# Patient Record
Sex: FEMALE | Race: WHITE | NOT HISPANIC OR LATINO | Employment: FULL TIME | RURAL
[De-identification: names, ages, dates, MRNs, and addresses within clinical notes are randomized per-mention and may not be internally consistent; named-entity substitution may affect disease eponyms.]

---

## 2023-11-10 ENCOUNTER — OFFICE VISIT (OUTPATIENT)
Dept: SPINE | Facility: CLINIC | Age: 43
End: 2023-11-10
Payer: COMMERCIAL

## 2023-11-10 ENCOUNTER — HOSPITAL ENCOUNTER (OUTPATIENT)
Dept: RADIOLOGY | Facility: HOSPITAL | Age: 43
Discharge: HOME | End: 2023-11-10
Attending: ORTHOPAEDIC SURGERY
Payer: COMMERCIAL

## 2023-11-10 VITALS — HEIGHT: 63 IN | BODY MASS INDEX: 25.69 KG/M2 | WEIGHT: 145 LBS

## 2023-11-10 DIAGNOSIS — G89.29 CHRONIC SI JOINT PAIN: ICD-10-CM

## 2023-11-10 DIAGNOSIS — M53.3 CHRONIC SI JOINT PAIN: Primary | ICD-10-CM

## 2023-11-10 DIAGNOSIS — G89.29 CHRONIC LEFT HIP PAIN: ICD-10-CM

## 2023-11-10 DIAGNOSIS — M53.3 CHRONIC SI JOINT PAIN: ICD-10-CM

## 2023-11-10 DIAGNOSIS — M54.42 CHRONIC LEFT-SIDED LOW BACK PAIN WITH LEFT-SIDED SCIATICA: Primary | ICD-10-CM

## 2023-11-10 DIAGNOSIS — G89.29 CHRONIC LEFT-SIDED LOW BACK PAIN WITH LEFT-SIDED SCIATICA: Primary | ICD-10-CM

## 2023-11-10 DIAGNOSIS — G89.29 CHRONIC SI JOINT PAIN: Primary | ICD-10-CM

## 2023-11-10 DIAGNOSIS — M25.552 CHRONIC LEFT HIP PAIN: ICD-10-CM

## 2023-11-10 PROCEDURE — 72202 X-RAY EXAM SI JOINTS 3/> VWS: CPT | Mod: TC

## 2023-11-10 PROCEDURE — 72110 X-RAY EXAM L-2 SPINE 4/>VWS: CPT | Mod: TC

## 2023-11-10 PROCEDURE — 99204 OFFICE O/P NEW MOD 45 MIN: CPT | Mod: S$GLB,,, | Performed by: ORTHOPAEDIC SURGERY

## 2023-11-10 PROCEDURE — 72202 XR SACROILIAC JOINTS COMPLETE: ICD-10-PCS | Mod: 26,,, | Performed by: ORTHOPAEDIC SURGERY

## 2023-11-10 PROCEDURE — 72202 X-RAY EXAM SI JOINTS 3/> VWS: CPT | Mod: 26,,, | Performed by: ORTHOPAEDIC SURGERY

## 2023-11-10 PROCEDURE — 1159F PR MEDICATION LIST DOCUMENTED IN MEDICAL RECORD: ICD-10-PCS | Mod: S$GLB,,, | Performed by: ORTHOPAEDIC SURGERY

## 2023-11-10 PROCEDURE — 1160F RVW MEDS BY RX/DR IN RCRD: CPT | Mod: S$GLB,,, | Performed by: ORTHOPAEDIC SURGERY

## 2023-11-10 PROCEDURE — 1160F PR REVIEW ALL MEDS BY PRESCRIBER/CLIN PHARMACIST DOCUMENTED: ICD-10-PCS | Mod: S$GLB,,, | Performed by: ORTHOPAEDIC SURGERY

## 2023-11-10 PROCEDURE — 99204 PR OFFICE/OUTPT VISIT, NEW, LEVL IV, 45-59 MIN: ICD-10-PCS | Mod: S$GLB,,, | Performed by: ORTHOPAEDIC SURGERY

## 2023-11-10 PROCEDURE — 3008F PR BODY MASS INDEX (BMI) DOCUMENTED: ICD-10-PCS | Mod: S$GLB,,, | Performed by: ORTHOPAEDIC SURGERY

## 2023-11-10 PROCEDURE — 72110 X-RAY EXAM L-2 SPINE 4/>VWS: CPT | Mod: 26,,, | Performed by: ORTHOPAEDIC SURGERY

## 2023-11-10 PROCEDURE — 3008F BODY MASS INDEX DOCD: CPT | Mod: S$GLB,,, | Performed by: ORTHOPAEDIC SURGERY

## 2023-11-10 PROCEDURE — 72110 XR LUMBAR SPINE 4-5 VIEW WITH BENDING VIEWS: ICD-10-PCS | Mod: 26,,, | Performed by: ORTHOPAEDIC SURGERY

## 2023-11-10 PROCEDURE — 99203 OFFICE O/P NEW LOW 30 MIN: CPT | Mod: PBBFAC | Performed by: ORTHOPAEDIC SURGERY

## 2023-11-10 PROCEDURE — 1159F MED LIST DOCD IN RCRD: CPT | Mod: S$GLB,,, | Performed by: ORTHOPAEDIC SURGERY

## 2023-11-10 RX ORDER — MELOXICAM 7.5 MG/1
7.5 TABLET ORAL 2 TIMES DAILY
Qty: 60 TABLET | Refills: 11 | Status: SHIPPED | OUTPATIENT
Start: 2023-11-10

## 2023-11-10 RX ORDER — NORETHINDRONE ACETATE AND ETHINYL ESTRADIOL 1MG-20(21)
1 KIT ORAL
COMMUNITY
Start: 2023-10-13

## 2023-11-10 NOTE — LETTER
November 10, 2023      [facility]  [address]  [place] [address]  Phone: [phone]  Fax: [phone]       Patient: Carina Glynn   YOB: 1980  Date of Visit: 11/10/2023    To Whom It May Concern:    Lalitha Glynn  was at [facility] on 11/10/2023. Please excuse her absence. If you have any questions or concerns, or if I can be of further assistance, please do not hesitate to contact me.    Sincerely,    Dr. Ector Aleman

## 2023-11-10 NOTE — PROGRESS NOTES
MDM/time:  Greater than 45 minutes spent on this encounter including 15 minutes reviewing imaging and notes, 20 minutes with the patient, 10 minutes documentation    ASSESSMENT:  43 y.o. female with lumbar spondylosis with radiculopathy    PLAN:  Mobic 7.5 mg 1 tablet twice a day  Physical therapy lumbar spine  Could consider referral to pain management could consider referral to pain management to discuss injections  Follow-up in 6 weeks if no improvement consider MRI    HPI:  43 y.o. female here for evaluation of left-sided lower back pain that radiates into the left hip down the left gluteal fold and thigh.  Patient reports she is had pain for the past 2 years off unknown on the left lower side in the past his thought this may be related to her SI joint but now she said over the past few weeks pain feels like it is wrapping around the hip area with a burning radiating pain.  Increased pain with standing.  Patient denies difficulty with  strength.  No issues with balance or falls.  Denies bladder bowel incontinence.  No difficulty walking distances.  Currently taking Aleve as needed for pain.  No recent physical therapy.  No prior pain management.  No recent MRI.  No prior spine surgery.  Patient is not a smoker.    IMAGING:  AP, lateral, flexion/extension views of the lumbar spine reviewed     On the AP there is normal coronal alignment.  There are 5 non-rib-bearing lumbar vertebrae.  On the lateral there is maintained lumbar lordosis.  No fractures or listhesis noted.  No instability on flexion-extension views.     Impression:  Normal lumbar spine       No past medical history on file.  No past surgical history on file.  Social History     Tobacco Use    Smoking status: Never    Smokeless tobacco: Never      Current Outpatient Medications   Medication Instructions    norethindrone-ethinyl estradiol (JUNEL FE 1/20) 1 mg-20 mcg (21)/75 mg (7) per tablet 1 tablet, Oral        EXAM:  Constitutional  General  Appearance:  Body mass index is 25.69 kg/m²., NAD  Psychiatric   Orientation: Oriented to time, oriented to place, oriented to person  Mood and Affect: Active and alert, normal mood, normal affect  Gait and Station   Appearance:  Normal gait, normal tandem gait, able to walk on toes, able to walk on heels    LUMBAR  Musculoskeletal System   Hips: Normal appearance, no leg length discrepancy, painful decreased motion; left, normal motion; right    Lumbar Spine                   Inspection:  Normal alignment, normal sagittal balance                  Range of motion: Normal flexion, extension, lateral bending, rotation. Pain with range of motion                  Bony Palpation of the Lumbar Spine:  No tenderness of the spinous process, no tenderness of the sacrum, no tenderness of the coccyx                  Bony Palpation of the Right Hip:  No tenderness of the iliac crest, no tenderness of the sciatic notch, no tenderness of the SI joint                  Bony Palpation of the Left Hip:  No tenderness of the iliac crest, no tenderness of the sciatic notch, no tenderness of the SI joint                  Soft Tissue Palpation on the Right:  No tenderness of the paraspinal region, no tenderness of the iliolumbar region                  Soft Tissue Palpation on the Left:  No tenderness of the paraspinal region, no tenderness of the iliolumbar region    Motor Strength   L1 Right:  Hip flexion iliopsoas 5/5    L1 Left:  Hip flexion iliopsoas 5/5              L2-L4 Right:  Knee extension quadriceps 5/5, tibialis anterior 5/5              L2-L4 Left:  Knee extension quadriceps 5/5, tibialis anterior 5/5   L5 Right:  Extensor hallucis llongus 5/5,    L5 Left:  Extensor hallucis longus 5/5,    S1 Right:  Plantar flexion gastrocnemius 5/5   S1 Left:  Plantar flexion gastrocnemius 5/5    Neurological System   Ankle Reflex Right:  normal   Ankle Reflex Left: normal   Knee Reflex Right:  normal   Knee Reflex Left:   normal   Sensation on the Right:  L2 normal, L3 normal, L4 normal, L5 normal, S1 normal   Sensation on the Left:  L2 normal, L3 normal, L4 normal, L5 normal, S1 normal              Special Test on the Right:  Seated straight leg raising test negative, no clonus of the ankle              Special Test on the Left:  Seated straight leg raising test negative, no clonus of the ankle    Skin   Lumbosacral Spine:  Normal skin    Cardiovascular System   Arterial Pulses Right:  Posterior tibialis normal, dorsalis pedis normal   Arterial Pulses Left:  Posterior tibialis normal, dorsalis pedis normal   Edema Right: None   Edema Left:  None

## 2023-11-10 NOTE — PROGRESS NOTES
AP pelvis and oblique views of bilateral SI joints reviewed     No pelvic ring injuries noted.  No significant hip arthritis.  Minimal bilateral sacroiliac arthritis.      Impression:  Minimal bilateral sacroiliac arthritis

## 2023-11-27 ENCOUNTER — CLINICAL SUPPORT (OUTPATIENT)
Dept: REHABILITATION | Facility: HOSPITAL | Age: 43
End: 2023-11-27
Payer: COMMERCIAL

## 2023-11-27 DIAGNOSIS — M54.42 CHRONIC LEFT-SIDED LOW BACK PAIN WITH LEFT-SIDED SCIATICA: Primary | ICD-10-CM

## 2023-11-27 DIAGNOSIS — M25.552 CHRONIC LEFT HIP PAIN: ICD-10-CM

## 2023-11-27 DIAGNOSIS — G89.29 CHRONIC LEFT-SIDED LOW BACK PAIN WITH LEFT-SIDED SCIATICA: Primary | ICD-10-CM

## 2023-11-27 DIAGNOSIS — G89.29 CHRONIC LEFT HIP PAIN: ICD-10-CM

## 2023-11-27 PROCEDURE — 97161 PT EVAL LOW COMPLEX 20 MIN: CPT

## 2023-11-27 NOTE — PLAN OF CARE
"[facility]  Physical Therapy Initial Evaluation    Name: Carina Glynn  Clinic Number: [id number]    Therapy Diagnosis:   Encounter Diagnoses   Name Primary?    Chronic left-sided low back pain with left-sided sciatica Yes    Chronic left hip pain      Physician: Ector Aleman MD    Physician Orders: PT Eval and Treat  Medical Diagnosis from Referral: M54.42,G89.29 (ICD-10-CM) - Chronic left-sided low back pain with left-sided sciatica and M25.552,G89.29 (ICD-10-CM) - Chronic left hip pain  Evaluation Date: 11/27/2023  Authorization Period Expiration: 11/9/2024  Plan of Care Expiration: 1/12/2024  Visit # / Visits authorized: 1/13 (including initial evaluation; 50 visits approved by [employer] through 12/31/2023)    Time In: 1451  Time Out: 1530  Total Appointment Time (timed & untimed codes): 39 minutes    Precautions: Standard    Subjective     Date of onset: ~2 years ago    History of current condition - Carina reports an ~2 year history of left-sided low back pain that radiates into the left hip and down the left gluteal fold and thigh to the knee. Patient reports increased pain with standing and prolonged positioning. Patient reports she was recently started on Mobic which she feels may have helped her pain some. Patient reports her nephew, who is a chiropractor, worked with her over the weekend. Patient reports she was initially very sore but has begun to feel some better since. Patient reports she wore heels for the first time today in quite a while and feels as though her low back/left hip pain has increased some as a result.     Falls: none    Imaging:    X-rays from 11/10/2023:    "AP pelvis and oblique views of bilateral SI joints reviewed: No pelvic ring injuries noted. No significant hip arthritis. Minimal bilateral sacroiliac arthritis."    "AP, lateral, flexion/extension views of the lumbar spine reviewed: On the AP there is normal coronal alignment. There are " "5 non-rib-bearing lumbar vertebrae. On the lateral there is maintained lumbar lordosis. No fractures or listhesis noted. No instability on flexion-extension views. Impression: Normal lumbar spine."    Prior Therapy: none  Steps/Stairs: none  Occupation: Principal at [employer]  Driving: Yes  Durable Medical Equipment: none  Affected Extremity: left  Prior Level of Function: independent with all functional mobility without assistive device  Current Level of Function: same as prior level of function but with increase pain in the left lower back/hip with radiculopathy into the left posterior lower extremity to the knee    Pain:  Current 3/10, worst 8/10, best 1/10   Location: left low back wrapping around the left hip with radiculopathy into the left posterior lower extremity to the knee   Description: Burning, Tight, Tingling, and Shooting  Aggravating Factors: prolonged positioning (tight/stiff in mornings after lying in bed all night)  Easing Factors: massage, pain medication, heating pad, and rest    Pts goals: Patient wishes to decrease the pain in her left lower back/hip.    Medical History:   No past medical history on file.    Surgical History:   Carina Glynn  has no past surgical history on file.    Medications:   Carina has a current medication list which includes the following prescription(s): meloxicam and norethindrone-ethinyl estradiol.    Allergies:   Review of patient's allergies indicates:  No Known Allergies     Objective     Posture:  Standing lordosis: WNL  Sitting lordosis: WNL  Iliac crest height: right, increased  PSIS height: right, increased  Pelvic rotation/torsion: Yes (right anterior innominate rotation)  Scoliosis: No  Lateral shift: No    Thoracolumbar range of motion:   AROM Pain/Dysfunction with Movement   Flexion WFL    Extension WFL Pain at end range   Right lateral flexion WFL    Left lateral flexion WFL    Right rotation WFL    Left rotation WFL      Manual muscle " testing:   Right Left   Hip flexion MMT number: 4+/5 MMT strength: 4-/5   Hip abduction MMT strength: 4+/5 MMT strength: 4-/5   Knee extension MMT strength: 5/5 MMT strength: 4+/5   Knee flexion  MMT strength: 5/5 MMT strength: 4/5   Ankle dorsiflexion MMT strength: 5/5 MMT strength: 5/5   Ankle plantar flexion  MMT strength: 5/5 MMT strength: 5/5     Hip/knee range of motion:   Right Left    Hip flexion (120) WFL WFL   Hip extension WFL WFL   Hip abduction WFL WFL   Hip adduction WFL WFL   Internal rotation (45) WFL WFL   External rotation (45) WFL WFL   Hamstring 90/90 (-10) WFL WFL   Knee extension WFL WFL   Knee flexion (120) WFL WFL     Clinical Special Tests:  SLR test < 60 degrees: right Negative; left Negative  SLR test > 60 degrees: right Negative; left Negative  Piriformis test: right Negative; left Positive  ELLI test: right Negative; left Positive  SI compression: right Negative; left Positive  Bilateral SLR: right Negative; left Negative    Gait:  Weight bearing precautions: WBAT  Assistive device: none  Ambulation distance and deviations: independent without assistive device; no abnormalities noted  Stairs: WFL    Leg length: right lower extremity slightly longer than left in standing, supine and sunrise view    Palpation: tenderness on palpation of the left SI joint and with P-A joint mobilization of the sacrum on the left; slight relief with long-axis manual distraction of the left lower extremity    Dermatome: WFL    Limitation/Restriction for FOTO Intake Survey    Therapist reviewed FOTO scores for Carina Glynn on 11/27/2023.   FOTO documents entered into [employer] app - see Media section.    Limitation Score: 72.2%       Patient Education and Home Exercises     Education provided: Patient educated on the importance of completing skilled physical therapy treatment and home exercise program as prescribed to maximize functional gains.     Written Home Exercises Provided: yes. Exercises were reviewed and  "Carina was able to demonstrate them prior to the end of the session. Carina demonstrated good understanding of the education provided.     See EMR under "Patient Instructions" for exercises provided during therapy sessions.    Assessment     Carina is a 43 y.o. female referred to outpatient physical therapy with a medical diagnosis of M54.42,G89.29 (ICD-10-CM) - Chronic left-sided low back pain with left-sided sciatica and M25.552,G89.29 (ICD-10-CM) - Chronic left hip pain. Pt presents to PT with left lower back and left hip pain, left lower extremity weakness, a leg length discrepancy (right longer), and a right anterior innominate pelvic rotation.    Pt prognosis is Good.   Pt will benefit from skilled outpatient Physical Therapy to address the deficits stated above and in the chart below, provide pt/family education, and to maximize pt's level of independence.     Plan of care discussed with patient: Yes  Pt's spiritual, cultural and educational needs considered and pt agreeable to plan of care and goals as stated below:     Anticipated Barriers for therapy: compliance with home exercise program    Medical Necessity is demonstrated by the following:  History  Co-morbidities and personal factors that may impact the plan of care [x] LOW: no personal factors / co-morbidities  [] MODERATE: 1-2 personal factors / co-morbidities  [] HIGH: 3+ personal factors / co-morbidities    Moderate / High Support Documentation:   Co-morbidities affecting plan of care: N/A    Personal Factors:   no deficits     Examination  Body Structures and Functions, activity limitations and participation restrictions that may impact the plan of care [x] LOW: addressing 1-2 elements  [] MODERATE: 3+ elements  [] HIGH: 4+ elements (please support below)    Moderate / High Support Documentation: strength and posture     Clinical Presentation [x] LOW: stable  [] MODERATE: Evolving  [] HIGH: Unstable     Decision Making/ Complexity Score: low   "     Goals:    Short Term Goals:  Patient will demonstrate independence with home exercise program to ensure carryover of treatment.  Patient will improve left lower extremity strength to 4+/5 to improve independence and safety with bed mobility, transfers, and gait.  Patient will report a reduction in left lower-back/hip pain from 8/10 to 6/10 at worst to improve quality of life.     Long Term Goals:  Patient will improve bilateral lower extremity strength to 5/5 to improve independence and safety with bed mobility, transfers, and gait.  Patient will demonstrate equal leg length/iliac crest height in standing to improve functional posture and reduce risk for further injury.   Patient will report a reduction in left lower-back/hip pain from 8/10 to 4/10 at worst to improve quality of life.     Plan     Plan of care Certification: 11/27/2023 to 1/12/2024.    Outpatient Physical Therapy 2 times weekly for 6 weeks to include the following interventions: Electrical Stimulation (IFC/premodulated IFC), Manual Therapy, Moist Heat/ Ice, Neuromuscular Re-ed, Patient Education, Therapeutic Activities, Therapeutic Exercise, and Ultrasound.       Raphael Bob, PT, DPT  11/27/2023

## 2023-11-30 ENCOUNTER — CLINICAL SUPPORT (OUTPATIENT)
Dept: REHABILITATION | Facility: HOSPITAL | Age: 43
End: 2023-11-30
Payer: COMMERCIAL

## 2023-11-30 DIAGNOSIS — G89.29 CHRONIC LEFT HIP PAIN: Primary | ICD-10-CM

## 2023-11-30 DIAGNOSIS — G89.29 CHRONIC LEFT-SIDED LOW BACK PAIN WITH LEFT-SIDED SCIATICA: ICD-10-CM

## 2023-11-30 DIAGNOSIS — M54.42 CHRONIC LEFT-SIDED LOW BACK PAIN WITH LEFT-SIDED SCIATICA: ICD-10-CM

## 2023-11-30 DIAGNOSIS — M25.552 CHRONIC LEFT HIP PAIN: Primary | ICD-10-CM

## 2023-11-30 PROCEDURE — 97140 MANUAL THERAPY 1/> REGIONS: CPT | Mod: CQ

## 2023-11-30 PROCEDURE — 97110 THERAPEUTIC EXERCISES: CPT | Mod: CQ

## 2023-11-30 PROCEDURE — 97112 NEUROMUSCULAR REEDUCATION: CPT | Mod: CQ

## 2023-11-30 NOTE — PROGRESS NOTES
[facility]  Physical Therapy Treatment Note    Name: Carina Glynn  Clinic Number: [id number]    Therapy Diagnosis:   Encounter Diagnoses   Name Primary?    Chronic left hip pain Yes    Chronic left-sided low back pain with left-sided sciatica      Physician: Ector Aleman MD    Visit Date: 11/30/2023    Physician Orders: PT Eval and Treat  Medical Diagnosis from Referral: M54.42,G89.29 (ICD-10-CM) - Chronic left-sided low back pain with left-sided sciatica and M25.552,G89.29 (ICD-10-CM) - Chronic left hip pain  Evaluation Date: 11/27/2023  Authorization Period Expiration: 11/9/2024  Plan of Care Expiration: 1/12/2024  Visit # / Visits authorized: 2/13 (including initial evaluation; 50 visits approved by [employer] through 12/31/2023)  PTA Visit #: 1    Time In: 1532  Time Out: 1614  Total Billable Time: 42 minutes    Precautions: Standard    Subjective     Pt reports: she has no pain at beginning of treatment; patient states she has been doing her home exercises program and feels like it is helping.    She was compliant with home exercise program.    Pain: 0/10  Location: left low back wrapping around the left hip with radiculopathy into the left posterior lower extremity to the knee      Objective     Carina received therapeutic exercises to develop strength, endurance, ROM, flexibility, and posture for 20 minutes including:    Nu step x 5 min  Calf board stretch on slant board: x 2 minutes  Hamstring stretch on step: 3 x 20 sec hold each  Open books: x 10 reps x 5 sec hold each (manual stabilization of hip)  Lateral trunk rotations 10 x 10 sec hold (manual overpressure)  Double knee to chest ball rollouts: 20 reps x 5 sec hold    Carina received the following manual therapy techniques: were applied to the: lumbar back for 12 minutes, including:    Manual lumbar traction using chair  Piriformis stretch (bilateral)  Sciatic nerve stretch (knee extended and hip internally  rotated)  Myofascial release to left hip and ITB using roller    Carina participated in neuromuscular re-education activities to improve: Posture for 10 minutes. The following activities were included:    Supine pelvic tilt: x 20 reps x 5 sec hold  Supine pelvic tilt with alternating marching: x 20 reps  Bridging: x 20 reps    Carina participated in dynamic functional therapeutic activities to improve functional performance for 0 minutes, including:    Chair squats: 2 x 10 reps (not performed)      Home Exercises Provided and Patient Education Provided     Education provided: Patient educated on the importance of completing skilled physical therapy treatment and home exercise program as prescribed to maximize functional gains.    Written Home Exercises Provided: Patient instructed to cont prior HEP. Exercises were reviewed and Carina was able to demonstrate them prior to the end of the session.  Carina demonstrated good  understanding of the education provided.     See EMR under Patient Instructions for exercises provided  during therapy sessions .    Assessment     Patient with good effort during treatment. Patient with slight leg length discrepancy (right > left) checked by PT Raphael Bob. Patient able to perform all exercises with minimal complaint of sciatic nerve pain. Patient not tender on manual to ITB with roller, but did have pain with sciatic nerve stretching. Patient with good tolerance of all manual performed this session. Patient with no complaints following treatment.    Carina isprogressing well towards her goals.   Pt prognosis is Good.     Pt will continue to benefit from skilled outpatient physical therapy to address the deficits listed in the problem list box on initial evaluation, provide pt/family education, and to maximize pt's level of independence in the home and community environment.     Pt's spiritual, cultural, and educational needs considered and pt agreeable to plan of care and  goals.     Anticipated barriers to physical therapy: compliance with home exercises program    Goals:    Short Term Goals:  Patient will demonstrate independence with home exercise program to ensure carryover of treatment.  Patient will improve left lower extremity strength to 4+/5 to improve independence and safety with bed mobility, transfers, and gait.  Patient will report a reduction in left lower-back/hip pain from 8/10 to 6/10 at worst to improve quality of life.      Long Term Goals:  Patient will improve bilateral lower extremity strength to 5/5 to improve independence and safety with bed mobility, transfers, and gait.  Patient will demonstrate equal leg length/iliac crest height in standing to improve functional posture and reduce risk for further injury.   Patient will report a reduction in left lower-back/hip pain from 8/10 to 4/10 at worst to improve quality of life.        Plan     Patient will continue to benefit from skilled physical therapy treatment as prescribed working towards goals listed above to maximize functional potential.     Abdifatah Hansen, PTA  11/30/2023

## 2023-12-05 ENCOUNTER — CLINICAL SUPPORT (OUTPATIENT)
Dept: REHABILITATION | Facility: HOSPITAL | Age: 43
End: 2023-12-05
Payer: COMMERCIAL

## 2023-12-05 DIAGNOSIS — M54.42 CHRONIC LEFT-SIDED LOW BACK PAIN WITH LEFT-SIDED SCIATICA: Primary | ICD-10-CM

## 2023-12-05 DIAGNOSIS — G89.29 CHRONIC LEFT-SIDED LOW BACK PAIN WITH LEFT-SIDED SCIATICA: Primary | ICD-10-CM

## 2023-12-05 DIAGNOSIS — M25.552 CHRONIC LEFT HIP PAIN: ICD-10-CM

## 2023-12-05 DIAGNOSIS — G89.29 CHRONIC LEFT HIP PAIN: ICD-10-CM

## 2023-12-05 PROCEDURE — 97140 MANUAL THERAPY 1/> REGIONS: CPT | Mod: CQ

## 2023-12-05 PROCEDURE — 97110 THERAPEUTIC EXERCISES: CPT | Mod: CQ

## 2023-12-05 PROCEDURE — 97112 NEUROMUSCULAR REEDUCATION: CPT | Mod: CQ

## 2023-12-05 NOTE — PROGRESS NOTES
[facility]  Physical Therapy Treatment Note    Name: Carina Glynn  Clinic Number: [id number]    Therapy Diagnosis:   Encounter Diagnoses   Name Primary?    Chronic left-sided low back pain with left-sided sciatica Yes    Chronic left hip pain        Physician: Ector Aleman MD    Visit Date: 12/5/2023    Physician Orders: PT Eval and Treat  Medical Diagnosis from Referral: M54.42,G89.29 (ICD-10-CM) - Chronic left-sided low back pain with left-sided sciatica and M25.552,G89.29 (ICD-10-CM) - Chronic left hip pain  Evaluation Date: 11/27/2023  Authorization Period Expiration: 11/9/2024  Plan of Care Expiration: 1/12/2024  Visit # / Visits authorized: 3/13 (including initial evaluation; 50 visits approved by [employer] through 12/31/2023)  PTA Visit #: 2    Time In: 1526 (Patient treatment started prior to being checked in)  Time Out: 1610  Total Billable Time: 44 minutes    Precautions: Standard    Subjective     Pt reports: she is doing well today with no pain reported    She was compliant with home exercise program.    Pain: 0/10  Location: left low back wrapping around the left hip with radiculopathy into the left posterior lower extremity to the knee      Objective     Carina received therapeutic exercises to develop strength, endurance, ROM, flexibility, and posture for 20 minutes including:    Nu step x 5 min  Calf board stretch on slant board: x 2 minutes  Hamstring stretch on step: 3 x 20 sec hold each  Open books: x 10 reps x 5 sec hold each (manual stabilization of hip)  Lateral trunk rotations 10 x 10 sec hold  Double knee to chest ball rollouts: 20 reps x 5 sec hold    Carina received the following manual therapy techniques: were applied to the: lumbar back for 14 minutes, including:    Manual lumbar traction using chair  Piriformis stretch (bilateral)  Sciatic nerve stretch (knee extended and hip internally rotated)    Myofascial release to left hip and ITB using roller  (not performed)    Carina participated in neuromuscular re-education activities to improve: Posture for 10 minutes. The following activities were included:    Supine pelvic tilt: x 20 reps x 5 sec hold  Supine pelvic tilt with alternating marching: x 20 reps  Bridging: x 20 reps    Carina participated in dynamic functional therapeutic activities to improve functional performance for 0 minutes, including:    Chair squats: 2 x 10 reps (not performed)      Home Exercises Provided and Patient Education Provided     Education provided: Patient educated on the importance of completing skilled physical therapy treatment and home exercise program as prescribed to maximize functional gains.    Written Home Exercises Provided: Patient instructed to cont prior HEP. Exercises were reviewed and Carina was able to demonstrate them prior to the end of the session.  Carina demonstrated good  understanding of the education provided.     See EMR under Patient Instructions for exercises provided  during therapy sessions .    Assessment     Patient with good effort during treatment. Patient able to perform all exercises with minimal complaint of sciatic nerve pain. Patient with good tolerance of all manual and chair traction performed this session. Patient with no complaints of pain following treatment.    Carina isprogressing well towards her goals.   Pt prognosis is Good.     Pt will continue to benefit from skilled outpatient physical therapy to address the deficits listed in the problem list box on initial evaluation, provide pt/family education, and to maximize pt's level of independence in the home and community environment.     Pt's spiritual, cultural, and educational needs considered and pt agreeable to plan of care and goals.     Anticipated barriers to physical therapy: compliance with home exercises program    Goals:    Short Term Goals:  Patient will demonstrate independence with home exercise program to ensure  carryover of treatment.  Patient will improve left lower extremity strength to 4+/5 to improve independence and safety with bed mobility, transfers, and gait.  Patient will report a reduction in left lower-back/hip pain from 8/10 to 6/10 at worst to improve quality of life.      Long Term Goals:  Patient will improve bilateral lower extremity strength to 5/5 to improve independence and safety with bed mobility, transfers, and gait.  Patient will demonstrate equal leg length/iliac crest height in standing to improve functional posture and reduce risk for further injury.   Patient will report a reduction in left lower-back/hip pain from 8/10 to 4/10 at worst to improve quality of life.        Plan     Patient will continue to benefit from skilled physical therapy treatment as prescribed working towards goals listed above to maximize functional potential.     Abdifatah Hansen, REBECA  12/5/2023

## 2023-12-28 ENCOUNTER — DOCUMENTATION ONLY (OUTPATIENT)
Dept: REHABILITATION | Facility: HOSPITAL | Age: 43
End: 2023-12-28
Payer: COMMERCIAL

## 2023-12-28 PROBLEM — M54.42 CHRONIC LEFT-SIDED LOW BACK PAIN WITH LEFT-SIDED SCIATICA: Status: RESOLVED | Noted: 2023-11-27 | Resolved: 2023-12-28

## 2023-12-28 PROBLEM — G89.29 CHRONIC LEFT HIP PAIN: Status: RESOLVED | Noted: 2023-11-27 | Resolved: 2023-12-28

## 2023-12-28 PROBLEM — G89.29 CHRONIC LEFT-SIDED LOW BACK PAIN WITH LEFT-SIDED SCIATICA: Status: RESOLVED | Noted: 2023-11-27 | Resolved: 2023-12-28

## 2023-12-28 PROBLEM — M25.552 CHRONIC LEFT HIP PAIN: Status: RESOLVED | Noted: 2023-11-27 | Resolved: 2023-12-28

## 2023-12-28 NOTE — PROGRESS NOTES
"[facility]  Physical Therapy Treatment Note    Name: Carina Glynn  Clinic Number: [id number]    Therapy Diagnosis:   Encounter Diagnoses   Name Primary?    Chronic left-sided low back pain with left-sided sciatica Yes    Chronic left hip pain      Physician: Ector Aleman MD    Visit Date: 12/28/2023    Physician Orders: PT Eval and Treat  Medical Diagnosis from Referral: M54.42,G89.29 (ICD-10-CM) - Chronic left-sided low back pain with left-sided sciatica and M25.552,G89.29 (ICD-10-CM) - Chronic left hip pain  Evaluation Date: 11/27/2023  Authorization Period Expiration: 11/9/2024  Plan of Care Expiration: 1/12/2024  Visit # / Visits authorized: 3/13 (including initial evaluation; 50 visits approved by [employer] through 12/31/2023)    Precautions: Standard    Subjective     Pt reports: She is doing well today with no pain reported.    She was compliant with home exercise program.    Pain: 0/10  Location: left low back wrapping around the left hip with radiculopathy into the left posterior lower extremity to the knee      Objective     Home Exercises Provided and Patient Education Provided     Education provided: Patient educated on the importance of completing skilled physical therapy treatment and home exercise program as prescribed to maximize functional gains.    Written Home Exercises Provided: Patient instructed to cont prior HEP. Exercises were reviewed and Carina was able to demonstrate them prior to the end of the session.  Carina demonstrated good  understanding of the education provided.     See EMR under Patient Instructions for exercises provided during therapy sessions.    Assessment     Patient completed 2 physical therapy treatment sessions following her initial evaluation and then cancelled her remaining appointments with reports of "symptom resolution." Patient will be discharged from skilled physical therapy treatment at this time.     Pt's spiritual, " cultural, and educational needs considered and pt agreeable to plan of care and goals.     Anticipated barriers to physical therapy: compliance with home exercises program    Goals:    Short Term Goals:  Patient will demonstrate independence with home exercise program to ensure carryover of treatment. [Met]  Patient will improve left lower extremity strength to 4+/5 to improve independence and safety with bed mobility, transfers, and gait. [Not met]  Patient will report a reduction in left lower-back/hip pain from 8/10 to 6/10 at worst to improve quality of life. [Met]     Long Term Goals:  Patient will improve bilateral lower extremity strength to 5/5 to improve independence and safety with bed mobility, transfers, and gait. [Not met]  Patient will demonstrate equal leg length/iliac crest height in standing to improve functional posture and reduce risk for further injury. [Met]   Patient will report a reduction in left lower-back/hip pain from 8/10 to 4/10 at worst to improve quality of life. [Met]     Discharge reason: Patient is now asymptomatic.    Plan     This patient is discharged from Physical Therapy.      Raphael Bob, PT, DPT  12/28/2023

## 2024-10-04 ENCOUNTER — OFFICE VISIT (OUTPATIENT)
Dept: FAMILY MEDICINE | Facility: CLINIC | Age: 44
End: 2024-10-04
Payer: COMMERCIAL

## 2024-10-04 VITALS
DIASTOLIC BLOOD PRESSURE: 70 MMHG | RESPIRATION RATE: 18 BRPM | BODY MASS INDEX: 26.58 KG/M2 | SYSTOLIC BLOOD PRESSURE: 110 MMHG | HEART RATE: 74 BPM | HEIGHT: 63 IN | OXYGEN SATURATION: 98 % | WEIGHT: 150 LBS | TEMPERATURE: 99 F

## 2024-10-04 DIAGNOSIS — J01.90 ACUTE NON-RECURRENT SINUSITIS, UNSPECIFIED LOCATION: Primary | ICD-10-CM

## 2024-10-04 DIAGNOSIS — R51.9 NONINTRACTABLE HEADACHE, UNSPECIFIED CHRONICITY PATTERN, UNSPECIFIED HEADACHE TYPE: ICD-10-CM

## 2024-10-04 DIAGNOSIS — J02.9 SORE THROAT: ICD-10-CM

## 2024-10-04 LAB
CTP QC/QA: YES
CTP QC/QA: YES
MOLECULAR STREP A: NEGATIVE
SARS-COV-2 RDRP RESP QL NAA+PROBE: NEGATIVE

## 2024-10-04 RX ORDER — CEFTRIAXONE 1 G/1
1 INJECTION, POWDER, FOR SOLUTION INTRAMUSCULAR; INTRAVENOUS
Status: COMPLETED | OUTPATIENT
Start: 2024-10-04 | End: 2024-10-04

## 2024-10-04 RX ORDER — DEXAMETHASONE SODIUM PHOSPHATE 4 MG/ML
8 INJECTION, SOLUTION INTRA-ARTICULAR; INTRALESIONAL; INTRAMUSCULAR; INTRAVENOUS; SOFT TISSUE
Status: COMPLETED | OUTPATIENT
Start: 2024-10-04 | End: 2024-10-04

## 2024-10-04 RX ORDER — AMOXICILLIN AND CLAVULANATE POTASSIUM 875; 125 MG/1; MG/1
1 TABLET, FILM COATED ORAL 2 TIMES DAILY
Qty: 20 TABLET | Refills: 0 | Status: SHIPPED | OUTPATIENT
Start: 2024-10-04 | End: 2024-10-14

## 2024-10-04 RX ADMIN — DEXAMETHASONE SODIUM PHOSPHATE 8 MG: 4 INJECTION, SOLUTION INTRA-ARTICULAR; INTRALESIONAL; INTRAMUSCULAR; INTRAVENOUS; SOFT TISSUE at 11:10

## 2024-10-04 RX ADMIN — CEFTRIAXONE 1 G: 1 INJECTION, POWDER, FOR SOLUTION INTRAMUSCULAR; INTRAVENOUS at 11:10

## 2024-10-04 NOTE — LETTER
October 4, 2024    Carina Glynn  [id number]  [place] [address]             [facility]  Family Medicine  [address] [place] [address]  Phone: [phone]   October 4, 2024     Patient: Carina Glynn   YOB: 1980   Date of Visit: 10/4/2024       To Whom it May Concern:    Carina Glynn was seen in my clinic on 10/4/2024. She may return to work on 10/04/2024 .    Please excuse her from any classes or work missed.    If you have any questions or concerns, please don't hesitate to call.    Sincerely,         Ana Estrada, NP

## 2024-10-04 NOTE — PROGRESS NOTES
"Subjective:       Carina Glynn is a 44 y.o. female who presents for evaluation of possible sinusitis. Symptoms include bilateral ear pressure/pain, congestion, headache described as dull, sinus pressure, and sore throat. Onset of symptoms was 5 days ago, and has been unchanged since that time. Treatment to date:  dayquil and nyquil .    Review of Systems  Pertinent items are noted in HPI.     Objective:      /70 (BP Location: Left arm, Patient Position: Sitting)   Pulse 74   Temp 98.5 °F (36.9 °C) (Temporal)   Resp 18   Ht 5' 3" (1.6 m)   Wt 68 kg (150 lb)   LMP 09/20/2024   SpO2 98%   BMI 26.57 kg/m²   General appearance: alert, appears stated age, and cooperative  Head: Normocephalic, without obvious abnormality, atraumatic  Eyes: conjunctivae/corneas clear. PERRL, EOM's intact. Fundi benign.  Ears: abnormal TM right ear - dull and abnormal TM left ear - dull  Nose: Nares normal. Septum midline. Mucosa normal. No drainage or sinus tenderness., moderate congestion, sinus tenderness bilateral  Throat: abnormal findings: mild oropharyngeal erythema and PND  Lungs: clear to auscultation bilaterally  Heart: regular rate and rhythm, S1, S2 normal, no murmur, click, rub or gallop  Extremities: extremities normal, atraumatic, no cyanosis or edema  Skin: Skin color, texture, turgor normal. No rashes or lesions  Lymph nodes: Cervical, supraclavicular, and axillary nodes normal.  Neurologic: Alert and oriented X 3, normal strength and tone. Normal symmetric reflexes. Normal coordination and gait     Assessment:      sinusitis     Plan:      Discussed the diagnosis and treatment of sinusitis.  Suggested symptomatic OTC remedies.  Nasal saline spray for congestion.  Augmentin per orders.  Follow up as needed.   "